# Patient Record
Sex: FEMALE | Race: ASIAN | Employment: FULL TIME | URBAN - METROPOLITAN AREA
[De-identification: names, ages, dates, MRNs, and addresses within clinical notes are randomized per-mention and may not be internally consistent; named-entity substitution may affect disease eponyms.]

---

## 2017-05-30 ENCOUNTER — OFFICE VISIT (OUTPATIENT)
Dept: INTERNAL MEDICINE CLINIC | Age: 42
End: 2017-05-30

## 2017-05-30 VITALS
SYSTOLIC BLOOD PRESSURE: 92 MMHG | OXYGEN SATURATION: 94 % | HEART RATE: 75 BPM | BODY MASS INDEX: 24.73 KG/M2 | TEMPERATURE: 98.9 F | WEIGHT: 131 LBS | DIASTOLIC BLOOD PRESSURE: 60 MMHG | RESPIRATION RATE: 14 BRPM | HEIGHT: 61 IN

## 2017-05-30 DIAGNOSIS — I10 BENIGN HYPERTENSION: Primary | ICD-10-CM

## 2017-05-30 DIAGNOSIS — Z79.899 ENCOUNTER FOR LONG-TERM (CURRENT) USE OF MEDICATIONS: ICD-10-CM

## 2017-05-30 NOTE — MR AVS SNAPSHOT
Visit Information Date & Time Provider Department Dept. Phone Encounter #  
 5/30/2017 11:40 AM Anne Nj MD [place] [id number] [facility] [phone] [id number] Follow-up Instructions Return in about 6 months (around 11/30/2017) for hypertension. Upcoming Health Maintenance Date Due INFLUENZA AGE 9 TO ADULT 8/1/2017 PAP AKA CERVICAL CYTOLOGY 12/1/2019 DTaP/Tdap/Td series (2 - Td) 6/29/2025 Allergies as of 5/30/2017  Review Complete On: 5/30/2017 By: Anne Nj MD  
  
 Severity Noted Reaction Type Reactions Hydrochlorothiazide  09/18/2011   Side Effect Shortness of Breath Methyldopa  09/18/2011   Side Effect Other (comments) LFT increases Procardia [Nifedipine]  09/18/2011   Side Effect Other (comments)  
 headache  
 Sulfa (Sulfonamide Antibiotics)  09/07/2011    Hives Current Immunizations  Reviewed on 11/23/2016 Name Date Tdap 6/29/2015  8:48 AM  
  
 Not reviewed this visit You Were Diagnosed With   
  
 Codes Comments Benign hypertension    -  Primary ICD-10-CM: I10 
ICD-9-CM: 401.1 Encounter for long-term (current) use of medications     ICD-10-CM: Z79.899 ICD-9-CM: V58.69 Vitals BP Pulse Temp Resp Height(growth percentile) Weight(growth percentile) 92/60 (BP 1 Location: Left arm, BP Patient Position: Sitting) 75 98.9 °F (37.2 °C) (Oral) 14 5' 1\" (1.549 m) 131 lb (59.4 kg) LMP SpO2 BMI OB Status Smoking Status 05/14/2017 94% 24.75 kg/m2 Having regular periods Never Smoker Vitals History BMI and BSA Data Body Mass Index Body Surface Area 24.75 kg/m 2 1.6 m 2 [facility]erred [id number]  Sadie Krueger [facility]. [phone] Your Updated Medication List  
  
   
This list is accurate as of: 5/30/17 12:32 PM.  Always use your most recent med list.  
  
  
  
  
 lisinopril 10 mg tablet Commonly known as:  Juan C Leavitt  
 Take 1 Tab by mouth daily. MAXAIR AUTOHALER 200 mcg/Inhalation inhaler Generic drug:  pirbuterol Take 2 Puffs by inhalation four (4) times daily. mometasone 0.1 % topical cream  
Commonly known as:  Saurabhria Gun Apply  to affected area daily. VITAMIN D3 2,000 unit Tab Generic drug:  cholecalciferol (vitamin D3) Take  by mouth. Follow-up Instructions Return in about 6 months (around 11/30/2017) for hypertension. Introducing \[facility]\"" & [facility] SERVICES! Dear Yan Daley: Thank you for requesting a [employer] account. Our records indicate that you already have an active [employer] account. You can access your account anytime at [email or web address]. [email or web address] Did you know that you can access your hospital and ER discharge instructions at any time in [employer]? You can also review all of your test results from your hospital stay or ER visit. Additional Information If you have questions, please visit the Frequently Asked Questions section of the [employer] website at [email or web address] Remember, [employer] is NOT to be used for urgent needs. For medical emergencies, dial 911. Now available from your iPhone and Android! Please provide this summary of care documentation to your next provider. Your primary care clinician is listed as Tashia George. If you have any questions after today's visit, please call [phone].

## 2017-05-30 NOTE — PROGRESS NOTES
1. Have you been to the ER, urgent care clinic since your last visit? Hospitalized since your last visit? No    2. Have you seen or consulted any other health care providers outside of the [facility] since your last visit? Include any pap smears or colon screening.  No     Chief Complaint   Patient presents with    Hypertension     follow up     Not fasting

## 2017-05-30 NOTE — PROGRESS NOTES
Subjective: Tre Uriarte is a 42 y.o. female who presents today for follow up of hypertension. She is without any new concerns today. Blood pressure noted to be low today. She states that every once in a while, she will get a little lightheaded from stooping to standing. She denies any chest pain, shortness of breath, syncope, headaches, nausea/vomiting, or any bowel changes. She is getting  in September. Patient Active Problem List   Diagnosis Code    Asthma J45.909    Essential hypertension I10    Ectopic pregnancy 65    Pap smear for cervical cancer screening Z12.4    Vitamin D deficiency E55.9    Dermatitis L30.9    H/O screening mammography Z92.89     Current Outpatient Prescriptions   Medication Sig Dispense Refill    lisinopril (PRINIVIL, ZESTRIL) 10 mg tablet Take 1 Tab by mouth daily. 90 Tab 1    mometasone (ELOCON) 0.1 % topical cream Apply  to affected area daily. 15 g 1    cholecalciferol, vitamin D3, (VITAMIN D3) 2,000 unit tab Take  by mouth.  pirbuterol (MAXAIR AUTOHALER) 200 mcg/Inhalation inhaler Take 2 Puffs by inhalation four (4) times daily. Review of Systems    Pertinent items are noted in HPI. Objective:     Visit Vitals    BP 92/60 (BP 1 Location: Left arm, BP Patient Position: Sitting)    Pulse 75    Temp 98.9 °F (37.2 °C) (Oral)    Resp 14    Ht 5' 1\" (1.549 m)    Wt 131 lb (59.4 kg)    LMP 05/14/2017    SpO2 94%    BMI 24.75 kg/m2     General appearance: alert, cooperative, no distress, appears stated age  Head: Normocephalic, without obvious abnormality, atraumatic  Neck: supple, symmetrical, trachea midline, no adenopathy, no carotid bruit and no JVD  Lungs: clear to auscultation bilaterally  Heart: regular rate and rhythm, S1, S2 normal, no murmur, click, rub or gallop    Assessment/Plan:     1. Benign hypertension  -blood pressure low.    -patient to take only 1/2 of her lisinopril 10mg daily.      2. Encounter for long-term (current) use of medications     No orders of the defined types were placed in this encounter. Follow-up Disposition:     Follow up in 6 months     Return if symptoms worsen or fail to improve. Advised patient to call back or return to office if symptoms worsen/change/persist.     Discussed expected course/resolution/complications of diagnosis in detail with patient. Medication risks/benefits/costs/interactions/alternatives discussed with patient. Patient was given an after visit summary which includes diagnoses, current medications, & vitals. Patient expressed understanding with the diagnosis and plan.

## 2017-11-28 ENCOUNTER — OFFICE VISIT (OUTPATIENT)
Dept: INTERNAL MEDICINE CLINIC | Age: 42
End: 2017-11-28

## 2017-11-28 VITALS
RESPIRATION RATE: 14 BRPM | SYSTOLIC BLOOD PRESSURE: 104 MMHG | DIASTOLIC BLOOD PRESSURE: 66 MMHG | OXYGEN SATURATION: 100 % | HEART RATE: 70 BPM | BODY MASS INDEX: 24.92 KG/M2 | WEIGHT: 132 LBS | TEMPERATURE: 98.8 F | HEIGHT: 61 IN

## 2017-11-28 DIAGNOSIS — I10 BENIGN HYPERTENSION: Primary | ICD-10-CM

## 2017-11-28 DIAGNOSIS — Z79.899 ENCOUNTER FOR LONG-TERM (CURRENT) USE OF MEDICATIONS: ICD-10-CM

## 2017-11-28 NOTE — PROGRESS NOTES
Patient's identity verified with two patient identifiers (name and date of birth). 1. Have you been to the ER, urgent care clinic since your last visit? Hospitalized since your last visit? No  2. Have you seen or consulted any other health care providers outside of the [address] [place] [address] since your last visit? Include any pap smears or colon screening. No    Chief Complaint   Patient presents with    Hypertension     6 month follow up, has been loosing weight/exercising and states her BP has been on the lower side. Denies feeling dizzy/faint. Not fasting. Health Maintenance Due   Topic Date Due    Influenza Age 5 to Adult   Has not had- has never had it.  08/01/2017

## 2017-11-28 NOTE — PROGRESS NOTES
Subjective: Siobhan Stewart is a 36 y.o. female who presents today for follow up of her hypertension. She just got  in September. She and her  are thinking of starting a family. When she is ready, she will need to change her blood pressure medication. Patient Active Problem List   Diagnosis Code    Asthma J45.909    Essential hypertension I10    Ectopic pregnancy 65    Pap smear for cervical cancer screening Z12.4    Vitamin D deficiency E55.9    Dermatitis L30.9    H/O screening mammography Z92.89     Current Outpatient Prescriptions   Medication Sig Dispense Refill    lisinopril (PRINIVIL, ZESTRIL) 10 mg tablet Take 1 Tab by mouth daily. 90 Tab 1    mometasone (ELOCON) 0.1 % topical cream Apply  to affected area daily. 15 g 1    cholecalciferol, vitamin D3, (VITAMIN D3) 2,000 unit tab Take  by mouth. Review of Systems    Pertinent items are noted in HPI. Objective:     Visit Vitals    /66 (BP 1 Location: Left arm, BP Patient Position: Sitting)    Pulse 70    Temp 98.8 °F (37.1 °C) (Oral)    Resp 14    Ht 5' 1\" (1.549 m)    Wt 132 lb (59.9 kg)    LMP 11/26/2017 (Exact Date)    SpO2 100%    BMI 24.94 kg/m2     General appearance: alert, cooperative, no distress, appears stated age  Head: Normocephalic, without obvious abnormality, atraumatic  Neck: supple, symmetrical, trachea midline, no adenopathy, no carotid bruit and no JVD  Lungs: clear to auscultation bilaterally  Heart: regular rate and rhythm, S1, S2 normal, no murmur, click, rub or gallop  Abdomen: soft, non-tender. Bowel sounds normal. No masses,  no organomegaly  Extremities: extremities normal, atraumatic, no cyanosis or edema  Pulses: 2+ and symmetric    Assessment/Plan:     1. Benign hypertension  -I evaluated and recommended to continue current doses of medications.   -when she is ready to start a family, will switch her over to aldomet.   She has used this in the past.     - CBC WITH AUTOMATED DIFF  - METABOLIC PANEL, COMPREHENSIVE  - LIPID PANEL  - URINALYSIS W/ RFLX MICROSCOPIC    2. Encounter for long-term (current) use of medications    - CBC WITH AUTOMATED DIFF  - METABOLIC PANEL, COMPREHENSIVE  - LIPID PANEL  - URINALYSIS W/ RFLX MICROSCOPIC     Follow-up Disposition:     Follow up in 6 months     Return if symptoms worsen or fail to improve. Advised patient to call back or return to office if symptoms worsen/change/persist.     Discussed expected course/resolution/complications of diagnosis in detail with patient. Medication risks/benefits/costs/interactions/alternatives discussed with patient. Patient was given an after visit summary which includes diagnoses, current medications, & vitals. Patient expressed understanding with the diagnosis and plan.

## 2017-11-30 LAB
ALBUMIN SERPL-MCNC: 4.6 G/DL (ref 3.5–5.5)
ALBUMIN/GLOB SERPL: 1.5 {RATIO} (ref 1.2–2.2)
ALP SERPL-CCNC: 56 IU/L (ref 39–117)
ALT SERPL-CCNC: 12 IU/L (ref 0–32)
APPEARANCE UR: ABNORMAL
AST SERPL-CCNC: 17 IU/L (ref 0–40)
BASOPHILS # BLD AUTO: 0 X10E3/UL (ref 0–0.2)
BASOPHILS NFR BLD AUTO: 0 %
BILIRUB SERPL-MCNC: 0.3 MG/DL (ref 0–1.2)
BILIRUB UR QL STRIP: NEGATIVE
BUN SERPL-MCNC: 13 MG/DL (ref 6–24)
BUN/CREAT SERPL: 16 (ref 9–23)
CALCIUM SERPL-MCNC: 9.6 MG/DL (ref 8.7–10.2)
CHLORIDE SERPL-SCNC: 101 MMOL/L (ref 96–106)
CHOLEST SERPL-MCNC: 160 MG/DL (ref 100–199)
CO2 SERPL-SCNC: 24 MMOL/L (ref 18–29)
COLOR UR: YELLOW
CREAT SERPL-MCNC: 0.8 MG/DL (ref 0.57–1)
EOSINOPHIL # BLD AUTO: 0 X10E3/UL (ref 0–0.4)
EOSINOPHIL NFR BLD AUTO: 1 %
ERYTHROCYTE [DISTWIDTH] IN BLOOD BY AUTOMATED COUNT: 13.5 % (ref 12.3–15.4)
GFR SERPLBLD CREATININE-BSD FMLA CKD-EPI: 104 ML/MIN/1.73
GFR SERPLBLD CREATININE-BSD FMLA CKD-EPI: 91 ML/MIN/1.73
GLOBULIN SER CALC-MCNC: 3 G/DL (ref 1.5–4.5)
GLUCOSE SERPL-MCNC: 98 MG/DL (ref 65–99)
GLUCOSE UR QL: NEGATIVE
HCT VFR BLD AUTO: 39.4 % (ref 34–46.6)
HDLC SERPL-MCNC: 60 MG/DL
HGB BLD-MCNC: 13 G/DL (ref 11.1–15.9)
HGB UR QL STRIP: NEGATIVE
IMM GRANULOCYTES # BLD: 0 X10E3/UL (ref 0–0.1)
IMM GRANULOCYTES NFR BLD: 0 %
INTERPRETATION, 910389: NORMAL
KETONES UR QL STRIP: NEGATIVE
LDLC SERPL CALC-MCNC: 88 MG/DL (ref 0–99)
LEUKOCYTE ESTERASE UR QL STRIP: NEGATIVE
LYMPHOCYTES # BLD AUTO: 2 X10E3/UL (ref 0.7–3.1)
LYMPHOCYTES NFR BLD AUTO: 34 %
MCH RBC QN AUTO: 29.7 PG (ref 26.6–33)
MCHC RBC AUTO-ENTMCNC: 33 G/DL (ref 31.5–35.7)
MCV RBC AUTO: 90 FL (ref 79–97)
MICRO URNS: ABNORMAL
MONOCYTES # BLD AUTO: 0.3 X10E3/UL (ref 0.1–0.9)
MONOCYTES NFR BLD AUTO: 5 %
NEUTROPHILS # BLD AUTO: 3.4 X10E3/UL (ref 1.4–7)
NEUTROPHILS NFR BLD AUTO: 60 %
NITRITE UR QL STRIP: NEGATIVE
PH UR STRIP: 7 [PH] (ref 5–7.5)
PLATELET # BLD AUTO: 180 X10E3/UL (ref 150–379)
POTASSIUM SERPL-SCNC: 4.4 MMOL/L (ref 3.5–5.2)
PROT SERPL-MCNC: 7.6 G/DL (ref 6–8.5)
PROT UR QL STRIP: NEGATIVE
RBC # BLD AUTO: 4.38 X10E6/UL (ref 3.77–5.28)
SODIUM SERPL-SCNC: 143 MMOL/L (ref 134–144)
SP GR UR: 1.02 (ref 1–1.03)
TRIGL SERPL-MCNC: 61 MG/DL (ref 0–149)
UROBILINOGEN UR STRIP-MCNC: 0.2 MG/DL (ref 0.2–1)
VLDLC SERPL CALC-MCNC: 12 MG/DL (ref 5–40)
WBC # BLD AUTO: 5.8 X10E3/UL (ref 3.4–10.8)

## 2017-12-14 ENCOUNTER — HOSPITAL ENCOUNTER (OUTPATIENT)
Dept: MAMMOGRAPHY | Age: 42
Discharge: HOME | End: 2017-12-14
Attending: OBSTETRICS & GYNECOLOGY
Payer: COMMERCIAL

## 2017-12-14 DIAGNOSIS — Z12.31 VISIT FOR SCREENING MAMMOGRAM: ICD-10-CM

## 2017-12-14 PROCEDURE — 77067 SCR MAMMO BI INCL CAD: CPT

## 2018-06-07 ENCOUNTER — OFFICE VISIT (OUTPATIENT)
Dept: INTERNAL MEDICINE CLINIC | Age: 43
End: 2018-06-07

## 2018-06-07 VITALS
HEART RATE: 80 BPM | TEMPERATURE: 99.1 F | DIASTOLIC BLOOD PRESSURE: 70 MMHG | HEIGHT: 61 IN | SYSTOLIC BLOOD PRESSURE: 108 MMHG | OXYGEN SATURATION: 96 % | RESPIRATION RATE: 15 BRPM | WEIGHT: 136 LBS | BODY MASS INDEX: 25.68 KG/M2
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
## 2018-06-07 DIAGNOSIS — I10 BENIGN HYPERTENSION: Primary | ICD-10-CM

## 2018-06-07 DIAGNOSIS — Z79.899 ENCOUNTER FOR LONG-TERM (CURRENT) USE OF MEDICATIONS: ICD-10-CM

## 2018-06-07 RX ORDER — DOXYCYCLINE 100 MG/1
CAPSULE ORAL
COMMUNITY
Start: 2018-06-06 | End: 2018-12-12

## 2018-06-07 RX ORDER — TRIAMCINOLONE ACETONIDE 1 MG/G
CREAM TOPICAL
COMMUNITY
Start: 2018-06-06 | End: 2018-12-12

## 2018-06-07 NOTE — PROGRESS NOTES
Patient's identity verified with two patient identifiers (name and date of birth). Reviewed record in preparation for visit and have obtained necessary documentation. 1. Have you been to the ER, urgent care clinic since your last visit? Hospitalized since your last visit? Yes, see below. 2. Have you seen or consulted any other health care providers outside of the [address] [place] [address] since your last visit? Include any pap smears or colon screening. Yes, see below. Chief Complaint   Patient presents with    Hypertension     6 month follow up. States BP has been a little low, no dizziness today but intermittent w/ position changes. Takes 1/2 tab of Lisinopril 10mg.  Insect Bite     Complains of left lower leg bites, x1 on medial shin, x2 posterior leg by knee fold, noticed Tuesday PM, was out walking dog w/ . Complains areas are \"very itchy\", pain when pressed, raised, \"distinct red maame w/ surrounding pink\". Went to [facility] yesterday, didn't think it was tick bites, but blood drawn to test for lymes. Was given antibiotic, doxycycline BID & Triamincinolone cream.      Not fasting. There are no preventive care reminders to display for this patient.     Wt Readings from Last 3 Encounters:   06/07/18 136 lb (61.7 kg)   11/28/17 132 lb (59.9 kg)   05/30/17 131 lb (59.4 kg)     Temp Readings from Last 3 Encounters:   06/07/18 99.1 °F (37.3 °C) (Oral)   11/28/17 98.8 °F (37.1 °C) (Oral)   05/30/17 98.9 °F (37.2 °C) (Oral)     BP Readings from Last 3 Encounters:   06/07/18 108/70   11/28/17 104/66   05/30/17 92/60     Pulse Readings from Last 3 Encounters:   06/07/18 80   11/28/17 70   05/30/17 75       Learning Assessment:  :     Learning Assessment 6/7/2018 11/28/2017 6/29/2015 6/2/2014   PRIMARY LEARNER Patient Patient Patient Patient   HIGHEST LEVEL OF EDUCATION - PRIMARY LEARNER  > 4 YEARS OF COLLEGE > 4 YEARS OF COLLEGE > 4 YEARS OF COLLEGE > 4 YEARS OF COLLEGE   BARRIERS PRIMARY LEARNER NONE NONE NONE NONE   CO-LEARNER CAREGIVER No No No No   PRIMARY LANGUAGE ENGLISH ENGLISH ENGLISH ENGLISH    NEED - - No No   LEARNER PREFERENCE PRIMARY PICTURES PICTURES LISTENING LISTENING   LEARNING SPECIAL TOPICS - - no no   ANSWERED BY patient patient patient patient   RELATIONSHIP SELF SELF SELF SELF   ASSESSMENT COMMENT - - n/a -       Depression Screening:  :     PHQ over the last two weeks 6/7/2018   Little interest or pleasure in doing things Not at all   Feeling down, depressed or hopeless Not at all   Total Score PHQ 2 0       Abuse Screening:  :     Abuse Screening Questionnaire 6/7/2018 11/28/2017 6/2/2014   Do you ever feel afraid of your partner? N N N   Are you in a relationship with someone who physically or mentally threatens you? N N N   Is it safe for you to go home?  Beatriz Feliciano

## 2018-06-07 NOTE — MR AVS SNAPSHOT
[address] [facility], [address] [facility] 
[phone] Patient: Feng Bullock MRN: [id number] :1975 Visit Information Date & Time Provider Department Dept. Phone Encounter #  
 2018 12:00 PM Roseanna Hsieh MD [facility] [phone] Follow-up Instructions Return in about 6 months (around 2018) for hypertension. Upcoming Health Maintenance Date Due Influenza Age 5 to Adult 2018 BREAST CANCER SCRN MAMMOGRAM 2018 PAP AKA CERVICAL CYTOLOGY 2019 DTaP/Tdap/Td series (2 - Td) 2025 Allergies as of 2018  Review Complete On: 2018 By: Roseanna Hsieh MD  
  
 Severity Noted Reaction Type Reactions Hydrochlorothiazide  2011   Side Effect Shortness of Breath Methyldopa  2011   Side Effect Other (comments) LFT increases Procardia [Nifedipine]  2011   Side Effect Other (comments)  
 headache  
 Sulfa (Sulfonamide Antibiotics)  2011    Hives Current Immunizations  Reviewed on 2017 Name Date Tdap 2015  8:48 AM  
  
 Not reviewed this visit Vitals BP Pulse Temp Resp Height(growth percentile) Weight(growth percentile) 108/70 (BP 1 Location: Left arm, BP Patient Position: Sitting) 80 99.1 °F (37.3 °C) (Oral) 15 5' 1\" (1.549 m) 136 lb (61.7 kg) SpO2 BMI OB Status Smoking Status 96% 25.7 kg/m2 Having regular periods Never Smoker Vitals History BMI and BSA Data Body Mass Index Body Surface Area 25.7 kg/m 2 1.63 m 2 [facility]erred [id number], [address] [place] [address] [phone] Your Updated Medication List  
  
   
This list is accurate as of 18 12:32 PM.  Always use your most recent med list.  
  
  
  
  
 doxycycline 100 mg capsule Commonly known as:  VIBRAMYCIN  
  
 lisinopril 10 mg tablet Commonly known as:  Tamiko Golder Take 1 Tab by mouth daily. mometasone 0.1 % topical cream  
Commonly known as:  Thao Renetta Apply  to affected area daily. triamcinolone acetonide 0.1 % topical cream  
Commonly known as:  KENALOG  
  
 VITAMIN D3 2,000 unit Tab Generic drug:  cholecalciferol (vitamin D3) Take  by mouth. Follow-up Instructions Return in about 6 months (around 12/7/2018) for hypertension. Introducing hospitals & HEALTH SERVICES! Dear David Jones: Thank you for requesting a [employer] account. Our records indicate that you already have an active [employer] account. You can access your account anytime at [email or web address] Did you know that you can access your hospital and ER discharge instructions at any time in [employer]? You can also review all of your test results from your hospital stay or ER visit. Additional Information If you have questions, please visit the Frequently Asked Questions section of the [employer] website at [email or web address] Remember, [employer] is NOT to be used for urgent needs. For medical emergencies, dial 911. Now available from your iPhone and Android! Please provide this summary of care documentation to your next provider. Your primary care clinician is listed as Tashia George. If you have any questions after today's visit, please call [phone].

## 2018-06-07 NOTE — PROGRESS NOTES
Subjective: Yaquelin Tavera is a 36 y.o. female who presents today for follow up of her hypertension. She had insect bites to her left lower leg that she had concerns about were tick bites. [facility] [address] [facility] where she resides and they did Lyme testing and gave her kenolog cream to put on her bites and doxycycline. She has not started the doxycycline, she is waiting for her lyme test to be resulted first.    She has also had some lightheadedness with position changes. Sitting or lying to standing. She has been taking only lisinopril 5mg daily. No new supplements or other medications. Patient Active Problem List   Diagnosis Code    Asthma J45.909    Essential hypertension I10    Ectopic pregnancy 65    Pap smear for cervical cancer screening Z12.4    Vitamin D deficiency E55.9    Dermatitis L30.9    H/O screening mammography Z92.89     Current Outpatient Prescriptions   Medication Sig Dispense Refill    doxycycline (VIBRAMYCIN) 100 mg capsule       triamcinolone acetonide (KENALOG) 0.1 % topical cream       lisinopril (PRINIVIL, ZESTRIL) 10 mg tablet Take 1 Tab by mouth daily. (Patient taking differently: Take 5 mg by mouth daily.) 90 Tab 1    mometasone (ELOCON) 0.1 % topical cream Apply  to affected area daily. 15 g 1    cholecalciferol, vitamin D3, (VITAMIN D3) 2,000 unit tab Take  by mouth. Review of Systems    Pertinent items are noted in HPI.      Objective:     Visit Vitals    /70 (BP 1 Location: Left arm, BP Patient Position: Sitting)    Pulse 80    Temp 99.1 °F (37.3 °C) (Oral)    Resp 15    Ht 5' 1\" (1.549 m)    Wt 136 lb (61.7 kg)    SpO2 96%    BMI 25.7 kg/m2     General appearance: alert, cooperative, no distress, appears stated age  Head: Normocephalic, without obvious abnormality, atraumatic  Neck: supple, symmetrical, trachea midline, no adenopathy, no carotid bruit and no JVD  Lungs: clear to auscultation bilaterally  Heart: regular rate and rhythm, S1, S2 normal, no murmur, click, rub or gallop  Extremities: extremities normal, atraumatic, no cyanosis or edema  Skin: quarter sized red, flat, slightly raised lesion in medial posterior knee. Pruritic. Has a 1cm similar lesion on her left lateral mid thigh    Assessment/Plan:     1. Benign hypertension  -Stop lisinopril. Continue to monitor blood pressures at home. 2. Insect bite, initial encounter  -steroid cream is helping the pruritis. Lesions are of low suspicious for tick bite. If her labs for lyme are positive, she will take the doxycycline. She will follow up with her [facility] for her results. 3. Encounter for long-term (current) use of medications         Follow-up Disposition:   Follow up in 6 months   Fasting blood work at next visit. Return if symptoms worsen or fail to improve. Advised patient to call back or return to office if symptoms worsen/change/persist.     Discussed expected course/resolution/complications of diagnosis in detail with patient. Medication risks/benefits/costs/interactions/alternatives discussed with patient. Patient was given an after visit summary which includes diagnoses, current medications, & vitals. Patient expressed understanding with the diagnosis and plan.

## 2018-12-12 ENCOUNTER — OFFICE VISIT (OUTPATIENT)
Dept: INTERNAL MEDICINE CLINIC | Age: 43
End: 2018-12-12

## 2018-12-12 VITALS
DIASTOLIC BLOOD PRESSURE: 72 MMHG | BODY MASS INDEX: 26.24 KG/M2 | WEIGHT: 139 LBS | TEMPERATURE: 97.9 F | SYSTOLIC BLOOD PRESSURE: 134 MMHG | RESPIRATION RATE: 14 BRPM | HEIGHT: 61 IN | OXYGEN SATURATION: 98 % | HEART RATE: 83 BPM

## 2018-12-12 DIAGNOSIS — L30.9 ECZEMA, UNSPECIFIED TYPE: ICD-10-CM

## 2018-12-12 DIAGNOSIS — N95.1 PERIMENOPAUSE: ICD-10-CM

## 2018-12-12 DIAGNOSIS — Z00.00 ROUTINE ADULT HEALTH MAINTENANCE: ICD-10-CM

## 2018-12-12 DIAGNOSIS — I10 BENIGN HYPERTENSION: Primary | ICD-10-CM

## 2018-12-12 RX ORDER — MOMETASONE FUROATE 1 MG/G
CREAM TOPICAL DAILY
Qty: 15 G | Refills: 1 | Status: SHIPPED | OUTPATIENT
Start: 2018-12-12 | End: 2018-12-20 | Stop reason: ALTCHOICE

## 2018-12-12 NOTE — PROGRESS NOTES
Patient's identity verified with two patient identifiers (name and date of birth). Reviewed record in preparation for visit and have obtained necessary documentation. 1. Have you been to the ER, urgent care clinic since your last visit? Hospitalized since your last visit? No  2. Have you seen or consulted any other health care providers outside of the [address] [place] [address] since your last visit? Include any pap smears or colon screening. No    Chief Complaint   Patient presents with    Hypertension     6 month follow up, stopped Lisinopril last visit. Monitors BP, some weeks ranges 130s/90s, high stress at this time, denies sx. Typically around 125/90.  Irregular Menses     LMP 18, prior to this was 2018. Sister started premenopause at age 39, mom is  & unknown about hx, dad isn't aware. Some hot flashes intermittently. Unsure what to expect, complains of weight gain, no other complaints. GYN: Dr. Ashtyn Loja, due for exam.      Not fasting. Health Maintenance Due   Topic Date Due    Influenza Age 5 to Adult   Patient reports has not had. Has never had, declines.  2018    BREAST CANCER SCRN MAMMOGRAM   Due, scheduled next week, [facility]. 2018       Wt Readings from Last 3 Encounters:   18 139 lb (63 kg)   18 136 lb (61.7 kg)   17 132 lb (59.9 kg)     Temp Readings from Last 3 Encounters:   18 97.9 °F (36.6 °C) (Oral)   18 99.1 °F (37.3 °C) (Oral)   17 98.8 °F (37.1 °C) (Oral)     BP Readings from Last 3 Encounters:   18 134/72   18 108/70   17 104/66     Pulse Readings from Last 3 Encounters:   18 83   18 80   17 70

## 2018-12-12 NOTE — PROGRESS NOTES
Subjective: Mallory Morris is a 39 y.o. female who presents today for follow up of her hypertension. She stopped her lisinopril about 6 months ago due to low blood pressures. She has been monitoring at home. Her systolic ranges in the 114'M to 539'A with a diastolic in the 65-94. Tends to go up when she is stressed. 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 She states that architecture is also moving in a direction that makes her feel that she needs to make a change. She is also requesting a refill of her elocon for eczema on her ears - occurs mostly in the winter. She has also noted that her menstrual cycles have started to become irregular. Her older sister started in perimenopause around this age. She had a normal menstrual cycle in August, then skipped September, and had another cycle in October. She has done pregnancy tests which were negative. Patient Active Problem List   Diagnosis Code    Asthma J45.909    Essential hypertension I10    Ectopic pregnancy 65    Pap smear for cervical cancer screening Z12.4    Vitamin D deficiency E55.9    Dermatitis L30.9    H/O screening mammography Z92.89     Current Outpatient Medications   Medication Sig Dispense Refill    mometasone (ELOCON) 0.1 % topical cream Apply  to affected area daily. 15 g 1    cholecalciferol, vitamin D3, (VITAMIN D3) 2,000 unit tab Take 2,000 Units by mouth daily. Review of Systems    Pertinent items are noted in HPI. Objective:      Wt Readings from Last 3 Encounters:   12/12/18 139 lb (63 kg)   06/07/18 136 lb (61.7 kg)   11/28/17 132 lb (59.9 kg)     Temp Readings from Last 3 Encounters:   12/12/18 97.9 °F (36.6 °C) (Oral)   06/07/18 99.1 °F (37.3 °C) (Oral)   11/28/17 98.8 °F (37.1 °C) (Oral)     BP Readings from Last 3 Encounters:   12/12/18 134/72   06/07/18 108/70   11/28/17 104/66     Pulse Readings from Last 3 Encounters:   12/12/18 83   06/07/18 80   11/28/17 70       Visit Vitals  /72 (BP 1 Location: Left arm, BP Patient Position: Sitting)   Pulse 83   Temp 97.9 °F (36.6 °C) (Oral)   Resp 14   Ht 5' 1\" (1.549 m)   Wt 139 lb (63 kg)   LMP 11/29/2018 (Exact Date) Comment: 1st cycle since 8/2018   SpO2 98%   BMI 26.26 kg/m²     General appearance: alert, cooperative, no distress, appears stated age  Head: Normocephalic, without obvious abnormality, atraumatic  Neck: supple, symmetrical, trachea midline, no adenopathy, no carotid bruit and no JVD  Lungs: clear to auscultation bilaterally  Heart: RRR, -MGR    Assessment/Plan:     1. Routine adult health maintenance  -fasting labs today. - CBC WITH AUTOMATED DIFF  - METABOLIC PANEL, COMPREHENSIVE  - LIPID PANEL  - UA/M W/RFLX CULTURE, ROUTINE    2. Eczema, unspecified type  -elocon refilled      3. Perimenopause  -continue to monitor.  -regular follow up with her gyn    4. hypertension  -stable without use of medications. Orders Placed This Encounter    CBC WITH AUTOMATED DIFF    METABOLIC PANEL, COMPREHENSIVE    LIPID PANEL    UA/M W/RFLX CULTURE, ROUTINE    mometasone (ELOCON) 0.1 % topical cream     Sig: Apply  to affected area daily. Dispense:  15 g     Refill:  1      Follow-up Disposition:     Follow up yearly     Return if symptoms worsen or fail to improve. Advised patient to call back or return to office if symptoms worsen/change/persist.     Discussed expected course/resolution/complications of diagnosis in detail with patient. Medication risks/benefits/costs/interactions/alternatives discussed with patient. Patient was given an after visit summary which includes diagnoses, current medications, & vitals. Patient expressed understanding with the diagnosis and plan.

## 2018-12-13 LAB
ALBUMIN SERPL-MCNC: 4.5 G/DL (ref 3.5–5.5)
ALBUMIN/GLOB SERPL: 1.5 {RATIO} (ref 1.2–2.2)
ALP SERPL-CCNC: 70 IU/L (ref 39–117)
ALT SERPL-CCNC: 15 IU/L (ref 0–32)
APPEARANCE UR: CLEAR
AST SERPL-CCNC: 25 IU/L (ref 0–40)
BACTERIA #/AREA URNS HPF: ABNORMAL /[HPF]
BASOPHILS # BLD AUTO: 0 X10E3/UL (ref 0–0.2)
BASOPHILS NFR BLD AUTO: 0 %
BILIRUB SERPL-MCNC: 0.2 MG/DL (ref 0–1.2)
BILIRUB UR QL STRIP: NEGATIVE
BUN SERPL-MCNC: 17 MG/DL (ref 6–24)
BUN/CREAT SERPL: 23 (ref 9–23)
CALCIUM SERPL-MCNC: 9.3 MG/DL (ref 8.7–10.2)
CASTS URNS QL MICRO: ABNORMAL /LPF
CHLORIDE SERPL-SCNC: 103 MMOL/L (ref 96–106)
CHOLEST SERPL-MCNC: 138 MG/DL (ref 100–199)
CO2 SERPL-SCNC: 25 MMOL/L (ref 20–29)
COLOR UR: YELLOW
CREAT SERPL-MCNC: 0.74 MG/DL (ref 0.57–1)
EOSINOPHIL # BLD AUTO: 0.1 X10E3/UL (ref 0–0.4)
EOSINOPHIL NFR BLD AUTO: 1 %
EPI CELLS #/AREA URNS HPF: >10 /HPF
ERYTHROCYTE [DISTWIDTH] IN BLOOD BY AUTOMATED COUNT: 13.8 % (ref 12.3–15.4)
GLOBULIN SER CALC-MCNC: 3.1 G/DL (ref 1.5–4.5)
GLUCOSE SERPL-MCNC: 90 MG/DL (ref 65–99)
GLUCOSE UR QL: NEGATIVE
HCT VFR BLD AUTO: 39.1 % (ref 34–46.6)
HDLC SERPL-MCNC: 62 MG/DL
HGB BLD-MCNC: 13.2 G/DL (ref 11.1–15.9)
HGB UR QL STRIP: NEGATIVE
IMM GRANULOCYTES # BLD: 0 X10E3/UL (ref 0–0.1)
IMM GRANULOCYTES NFR BLD: 0 %
INTERPRETATION, 910389: NORMAL
KETONES UR QL STRIP: NEGATIVE
LDLC SERPL CALC-MCNC: 61 MG/DL (ref 0–99)
LEUKOCYTE ESTERASE UR QL STRIP: NEGATIVE
LYMPHOCYTES # BLD AUTO: 2.2 X10E3/UL (ref 0.7–3.1)
LYMPHOCYTES NFR BLD AUTO: 28 %
MCH RBC QN AUTO: 30.5 PG (ref 26.6–33)
MCHC RBC AUTO-ENTMCNC: 33.8 G/DL (ref 31.5–35.7)
MCV RBC AUTO: 90 FL (ref 79–97)
MICRO URNS: NORMAL
MICRO URNS: NORMAL
MONOCYTES # BLD AUTO: 0.4 X10E3/UL (ref 0.1–0.9)
MONOCYTES NFR BLD AUTO: 5 %
MUCOUS THREADS URNS QL MICRO: PRESENT
NEUTROPHILS # BLD AUTO: 5.2 X10E3/UL (ref 1.4–7)
NEUTROPHILS NFR BLD AUTO: 66 %
NITRITE UR QL STRIP: NEGATIVE
PH UR STRIP: 6.5 [PH] (ref 5–7.5)
PLATELET # BLD AUTO: 205 X10E3/UL (ref 150–379)
POTASSIUM SERPL-SCNC: 4.3 MMOL/L (ref 3.5–5.2)
PROT SERPL-MCNC: 7.6 G/DL (ref 6–8.5)
PROT UR QL STRIP: NEGATIVE
RBC # BLD AUTO: 4.33 X10E6/UL (ref 3.77–5.28)
RBC #/AREA URNS HPF: ABNORMAL /HPF
SODIUM SERPL-SCNC: 141 MMOL/L (ref 134–144)
SP GR UR: 1.02 (ref 1–1.03)
TRIGL SERPL-MCNC: 73 MG/DL (ref 0–149)
URINALYSIS REFLEX, 377202: NORMAL
UROBILINOGEN UR STRIP-MCNC: 0.2 MG/DL (ref 0.2–1)
VLDLC SERPL CALC-MCNC: 15 MG/DL (ref 5–40)
WBC # BLD AUTO: 7.8 X10E3/UL (ref 3.4–10.8)
WBC #/AREA URNS HPF: ABNORMAL /HPF

## 2018-12-19 ENCOUNTER — TELEPHONE (OUTPATIENT)
Dept: INTERNAL MEDICINE CLINIC | Age: 43
End: 2018-12-19

## 2018-12-19 ENCOUNTER — HOSPITAL ENCOUNTER (OUTPATIENT)
Dept: MAMMOGRAPHY | Age: 43
Discharge: HOME | End: 2018-12-19
Attending: OBSTETRICS & GYNECOLOGY
Payer: COMMERCIAL

## 2018-12-19 DIAGNOSIS — Z12.31 VISIT FOR SCREENING MAMMOGRAM: ICD-10-CM

## 2018-12-19 PROCEDURE — 77067 SCR MAMMO BI INCL CAD: CPT

## 2018-12-20 RX ORDER — MOMETASONE FUROATE 1 MG/G
OINTMENT TOPICAL DAILY
Qty: 15 G | Refills: 0 | Status: SHIPPED | OUTPATIENT
Start: 2018-12-20

## 2018-12-20 NOTE — TELEPHONE ENCOUNTER
Elocon cream not available. Sent script for elocon ointment. Orders Placed This Encounter    mometasone (ELOCON) 0.1 % ointment     Sig: Apply  to affected area daily.      Dispense:  15 g     Refill:  0

## 2019-05-03 ENCOUNTER — OFFICE VISIT (OUTPATIENT)
Dept: INTERNAL MEDICINE CLINIC | Age: 44
End: 2019-05-03

## 2019-05-03 VITALS
WEIGHT: 139.6 LBS | BODY MASS INDEX: 26.36 KG/M2 | OXYGEN SATURATION: 97 % | RESPIRATION RATE: 18 BRPM | HEIGHT: 61 IN | SYSTOLIC BLOOD PRESSURE: 150 MMHG | TEMPERATURE: 98.4 F | HEART RATE: 85 BPM | DIASTOLIC BLOOD PRESSURE: 120 MMHG

## 2019-05-03 DIAGNOSIS — J45.20 MILD INTERMITTENT ASTHMA WITHOUT COMPLICATION: ICD-10-CM

## 2019-05-03 DIAGNOSIS — I10 BENIGN ESSENTIAL HTN: Primary | ICD-10-CM

## 2019-05-03 RX ORDER — ALBUTEROL SULFATE 90 UG/1
1 AEROSOL, METERED RESPIRATORY (INHALATION)
Qty: 1 INHALER | Refills: 0 | Status: SHIPPED | OUTPATIENT
Start: 2019-05-03 | End: 2019-05-28 | Stop reason: SDUPTHER

## 2019-05-03 RX ORDER — LISINOPRIL 10 MG/1
10 TABLET ORAL DAILY
Qty: 30 TAB | Refills: 1 | Status: SHIPPED | OUTPATIENT
Start: 2019-05-03 | End: 2019-06-27 | Stop reason: SDUPTHER

## 2019-05-03 NOTE — PROGRESS NOTES
Reviewed record in preparation for visit and have obtained necessary documentation. Identified pt with two pt identifiers(name and ). Health Maintenance Due   Topic    Pneumococcal 0-64 years (1 of 1 - PPSV23)         Chief Complaint   Patient presents with    Hypertension     f/u        Wt Readings from Last 3 Encounters:   19 139 lb 9.6 oz (63.3 kg)   18 139 lb (63 kg)   18 136 lb (61.7 kg)     Temp Readings from Last 3 Encounters:   18 97.9 °F (36.6 °C) (Oral)   18 99.1 °F (37.3 °C) (Oral)   17 98.8 °F (37.1 °C) (Oral)     BP Readings from Last 3 Encounters:   18 134/72   18 108/70   17 104/66     Pulse Readings from Last 3 Encounters:   18 83   18 80   17 70           Learning Assessment:  :     Learning Assessment 5/3/2019 2018 2017 2015 2014   PRIMARY LEARNER Patient Patient Patient Patient Patient   HIGHEST LEVEL OF EDUCATION - PRIMARY LEARNER  > 4 YEARS OF COLLEGE > 4 YEARS OF COLLEGE > 4 YEARS OF COLLEGE > 4 YEARS OF COLLEGE > 4 YEARS OF COLLEGE   BARRIERS PRIMARY LEARNER NONE NONE NONE NONE NONE   CO-LEARNER CAREGIVER - No No No No   PRIMARY LANGUAGE ENGLISH ENGLISH ENGLISH ENGLISH ENGLISH    NEED - - - No No   LEARNER PREFERENCE PRIMARY VIDEOS PICTURES PICTURES LISTENING LISTENING   LEARNING SPECIAL TOPICS - - - no no   ANSWERED BY patient patient patient patient patient   RELATIONSHIP SELF SELF SELF SELF SELF   ASSESSMENT COMMENT - - - n/a -       Depression Screening:  :     3 most recent PHQ Screens 5/3/2019   Little interest or pleasure in doing things Not at all   Feeling down, depressed, irritable, or hopeless Not at all   Total Score PHQ 2 0       Fall Risk Assessment:  :     Fall Risk Assessment, last 12 mths 5/3/2019   Able to walk? Yes   Fall in past 12 months?  No       Abuse Screening:  :     Abuse Screening Questionnaire 5/3/2019 2018 2017 2014   Do you ever feel afraid of your partner? N N N N   Are you in a relationship with someone who physically or mentally threatens you? N N N N   Is it safe for you to go home? Y Y Y Y       Coordination of Care Questionnaire:  :     1) Have you been to an emergency room, urgent care clinic since your last visit? no  Hospitalized since your last visit? no             2) Have you seen or consulted any other health care providers outside of [address] [place] [address] since your last visit? no  (Include any pap smears or colon screenings in this section.)    3) Do you have an Advance Directive on file? no    4) Are you interested in receiving information on Advance Directives? NO      Patient is accompanied by  I have received verbal consent from Antonia Nunez to discuss any/all medical information while they are present in the room.

## 2019-05-03 NOTE — PROGRESS NOTES
40 yo female reports feeling fatigued for several weeks. She checked her BP this AM and found it elevated. She was on meds for HTN several years ago, but lost weight and was advised she could stop her med. No increased stressors. Caffeine is 1 cup of coffee per day. Salt may have been more recently. Alcohol is one drink/day. Diet is nutritious. No herbs or supplements. She walks for 2-3 miles/day. Tobacco - never. She has FH of HTN - father, sister and aunts/uncles. She was on Diltiazem after elevated BP after an ectopic pregnancy in 2006. She was later switched to Lisinopril until meds were stopped 2 years ago. She has asthma, and feels the chest tightness she sometimes feels while walking is related to that. She has had a mild cough recently. She is taking Zyrtec and using local honey. She used to use Max-Air, but not in some years. PE: WNWD WF accompanied by her    She is alert and healthy in appearance   Repeat BP - 144/116   Heart - RRR, no murmur   Lungs - clear    Imp: Recurrent HTN   Asthma    Plan: Lisinopril 10 mg daily   Rescue inhaler which she will try pre-exertion   F/u in 3 weeks  __________________________  Expected course of current diagnosed problem(s) as well as expected progression and possible complications, and desired follow up with provider are discussed with patient. Patient is encouraged to be back in touch with any questions or concerns. Patient expresses understanding of plan of care. Patient is given AVS which includes diagnoses, current medications, vitals.

## 2019-05-17 ENCOUNTER — PATIENT MESSAGE (OUTPATIENT)
Dept: INTERNAL MEDICINE CLINIC | Age: 44
End: 2019-05-17

## 2019-05-17 NOTE — TELEPHONE ENCOUNTER
From  [employer] To   Nurses Sent  5/17/2019  7:21 AM   ----- Message from [address], Generic sent at 5/17/2019  7:21 AM EDT -----     Dr. Paige Zuñiga,     I have an appointment with Dr. Ivana Boles this morning about the chest pains Merna had; I had 2 more episodes this morning -- one at 3:30 am, and the other (more severe one) at 5:45 am. I can describe the pain as one that starts feeling like acid reflux (burning), but on my chest, followed by tightness with cold sweats and shortness of breath. I am debating whether it would be better for me to go to the hospital here in [facility] rather than drive 2 hrs to [place] this morning. I was wondering if your office could please share the EKG taken 2 weeks ago with either me or a hospital.     [facility] your advice -- should I ahead and visit the ER, or drive down to [place] today? Homa Henson is driving me.)     Thanks in advance. From  [employer] To   Nurses Sent  5/17/2019  7:26 AM   ----- Message from [address], Generic sent at 5/17/2019  7:26 AM EDT -----     (Please read my earlier message first)     My BP yesterday morning (before taking lisinopril) was 114/72, so I decided to only take half of the prescribed 10mg of lisinopril. By 2:00pm yesterday, my BP dropped to 80/62 (resting HR of 73). Lynn Peoples called 911 this morning but I waved them off when the episode passed.

## 2019-05-17 NOTE — TELEPHONE ENCOUNTER
Consulted with Dr. Rachelle Boyd regarding pt's MyChart message. Pt was encouraged to be further evaluated by [facility] ER for a work up r/t her symptoms. She ackowledged understanding and agreed to plan. Pt will call the office back if she needs additional information once she get to the ER.

## 2019-05-21 ENCOUNTER — PATIENT MESSAGE (OUTPATIENT)
Dept: INTERNAL MEDICINE CLINIC | Age: 44
End: 2019-05-21

## 2019-05-21 NOTE — TELEPHONE ENCOUNTER
From: Andres Patel  To: Diya Carpenter MD  Sent: 5/21/2019 1:52 PM EDT  Subject: Alethea Fouzia Salazar,    I visited the ER last Friday, May 17th after experiencing 3 episodes of chest pain accompanied by a cold sweat. These episodes were approximately 2 hours apart. I attached the lab and test results from that visit. Since May 17th, I have not experienced a recurrence. The attending physician recommended that I see a cardiologist to follow-up; he thinks it may also be GI-related. I am in the process of finding and scheduling an appointment with a cardiologist in my area. One thing to note is that my BP while being monitored in the ER was consistently around 117/76 w/o having taken any medication. The attending physician asked me to discontinue the 10 mg lisinopril and monitor my BP at least once a day. It's been around 120/84.     TCT

## 2019-05-28 DIAGNOSIS — J45.20 MILD INTERMITTENT ASTHMA WITHOUT COMPLICATION: ICD-10-CM

## 2019-05-28 RX ORDER — ALBUTEROL SULFATE 90 UG/1
AEROSOL, METERED RESPIRATORY (INHALATION)
Qty: 1 INHALER | Refills: 0 | Status: SHIPPED | OUTPATIENT
Start: 2019-05-28

## 2019-06-27 DIAGNOSIS — I10 BENIGN ESSENTIAL HTN: ICD-10-CM

## 2019-06-27 RX ORDER — LISINOPRIL 10 MG/1
10 TABLET ORAL DAILY
Qty: 90 TAB | Refills: 0 | Status: SHIPPED | OUTPATIENT
Start: 2019-06-27 | End: 2019-09-24 | Stop reason: SDUPTHER

## 2019-06-27 NOTE — TELEPHONE ENCOUNTER
Last refill- 5/3/19  Last office visit - 5/3/2019  Next office visit -   Future Appointments   Date Time Provider Helen Buenrostroi   12/16/2019  8:20 AM MD RUTH ANN Friedman         Requested Prescriptions     Pending Prescriptions Disp Refills    lisinopril (PRINIVIL, ZESTRIL) 10 mg tablet 90 Tab 1     Sig: Take 1 Tab by mouth daily.

## 2019-09-24 DIAGNOSIS — I10 BENIGN ESSENTIAL HTN: ICD-10-CM

## 2019-09-24 RX ORDER — LISINOPRIL 10 MG/1
TABLET ORAL
Qty: 90 TAB | Refills: 0 | Status: SHIPPED | OUTPATIENT
Start: 2019-09-24